# Patient Record
Sex: FEMALE
[De-identification: names, ages, dates, MRNs, and addresses within clinical notes are randomized per-mention and may not be internally consistent; named-entity substitution may affect disease eponyms.]

---

## 2019-10-31 PROBLEM — Z00.00 ENCOUNTER FOR PREVENTIVE HEALTH EXAMINATION: Status: ACTIVE | Noted: 2019-10-31

## 2019-12-03 ENCOUNTER — APPOINTMENT (OUTPATIENT)
Dept: NEUROLOGY | Facility: CLINIC | Age: 29
End: 2019-12-03
Payer: MEDICAID

## 2019-12-03 VITALS
DIASTOLIC BLOOD PRESSURE: 93 MMHG | HEIGHT: 57 IN | HEART RATE: 97 BPM | BODY MASS INDEX: 32.36 KG/M2 | SYSTOLIC BLOOD PRESSURE: 152 MMHG | WEIGHT: 150 LBS

## 2019-12-03 DIAGNOSIS — G40.219 LOCALIZATION-RELATED (FOCAL) (PARTIAL) SYMPTOMATIC EPILEPSY AND EPILEPTIC SYNDROMES WITH COMPLEX PARTIAL SEIZURES, INTRACTABLE, W/OUT STATUS EPILEPTICUS: ICD-10-CM

## 2019-12-03 PROCEDURE — 95816 EEG AWAKE AND DROWSY: CPT

## 2019-12-03 PROCEDURE — 99205 OFFICE O/P NEW HI 60 MIN: CPT

## 2019-12-04 PROCEDURE — 95953: CPT

## 2019-12-05 PROCEDURE — 95953: CPT

## 2019-12-05 NOTE — ASSESSMENT
[FreeTextEntry1] : 30 yo RH woman with a static encephalopathy and poorly controlled epilepsy.  \par \par Based on her reported seizures semiology, it is difficult to say with certainty whether her seizures are of focal or generalized onset.  A genetic etiology is possible, considering family history.\par \par The idea of an EMU evaluation for seizure characterization was discussed with the parents, and at this time, they both feel a prolonged hospitalization with continuous EEG monitoring would be stressful and difficult for patient, but they will consider this if required in the future.  A brain MRI would also have to be done under anesthesia if attempted.\par \par For now, we can make medication adjustments in an attempt to improve seizure control.  Over time, we can aim for a treatment regimen combining Keppra, Vimpat, and Onfi (eventually), and see if this combination is more effective, while monitoring for adverse effects.\par \par Plan:\par 1. Optimize Keppra and taper off Tegretol as follows:\par Instructions for Keppra (Levetiracetam 500mg tablets):\par Week 1: 1000mg in morning and 1000mg at night\par Week 2: 1000mg in morning and 1500mg at night\par Week 3: 1500mg in morning and 1500mg at night, and continue this dose.\par \par Instructions for Tegretol (100mg tablets):\par Week 1: 100mg in morning and 150mg at night\par Week 2: 100mg in morning and 100mg at night\par Week 3: 100mg in morning\par Week 4: STOP\par \par 2. Continue Vimpat 200mg PO BID\par 3. Continue Phenobarbital 30mg PO BID for now.  We may try to slowing taper this off over time as well, and substitute with Onfi.\par 4. They will notify me of any seizure exacerbation or adverse effects.\par 5. Seizure precautions, including no driving or operating heavy equipment, no unsupervised swimming, using a shower instead of a bathtub, no climbing ladders or working at elevations, no use of sharp dangerous tools or devices.\par 6. Patient and parents agree with plan.\par 7. Follow up in 1 month.\par \par Talib Palmer MD\par [facility]\par \par Greater than 50% of the encounter was spent on counseling and coordination of care discussing differential diagnosis, diagnostic testing, and  treatment options. We have talked about appropriate follow up, and I have spent 45 minutes of face to face time with the patient.\par

## 2019-12-05 NOTE — PHYSICAL EXAM
[FreeTextEntry1] : Awake, alert, makes eye contact, able to shake hands with examiner.  Limited language expression, follows simple commands given by parents. Affect normal.  Cranial nerves grossly intact.  Motor exam: normal bulk, normal tone, moves all 4 extremities symmetrically.  No tremors or fasciculations.  Sensory exam: intact to LT.  DTRs: did not cooperate.  Coordination: unable to assess.  Gait: can stand with assistance, ambulates at home without assistance, but in wheelchair today.\par

## 2019-12-05 NOTE — HISTORY OF PRESENT ILLNESS
[FreeTextEntry1] : 28 yo RH woman, accompanied by her parents.  She has a baseline cognitive impairment, with minimal verbal output (in [place]), and is dependent on her parents for care.\par \par 16 days after being born she developed pneumonia while living in [facility].  She recovered but had motor delay, starting to walk at 16 months.  She also had speech delay.   At age 6, she developed diabetes requiring insulin.  She was hospitalized for almost a month trying to obtain glucose control.  At age 8, she developed a few words, trying to communicate with mother.  She can now communicate basic needs in [place] to her parents.  She continued having challenges with glucose control.  She had hearing checked which was normal.  At age 19, her father brought her to [facility].  She was followed by primary care while she was here.  \par \par In 2015, at the age of 26, she had her first seizure.  It was a generalized convulsive seizure.  She was taken to [facility].  While in the ER, she had 4 more convulsive seizures.  She followed up with Dr Mariee, neurologist, who has been managing her antiseizure medications.  She has continued to have seizures in spite of the medications.\par \par Seizure Description:  Her eyes roll upwards, she has a behavioral arrest, then her entire body becomes rigid, she stops breathing, and her body remains rigid for 30-40 seconds.  When she is recovering she becomes combative.\par \par Seizure Frequency: 2-3 seizures per week\par \par Family History: paternal cousin with similar syndrome of cognitive impairment and epilepsy\par \par Social Hx: lives with parents, home health aid 4 hours per day 3 times per week.  She has one older sister, healthy. \par \par PCP: Dr Laila Lawler ([facility])\par Neurologist: Dr Jana Mariee (assistant Eva [phone])\par \par Current AEDs:\par Keppra 500mg PO BID (for 1.5 years, no side effects, never been on higher dose)\par Vimpat 200mg PO BID (x 3 years)\par Tegretol 400mg PO BID (x 2 years)\par Phenobarbital 30mg PO BID (x 1-2 years)\par \par Previous AEDs:\par Dilantin (gingival hyperplasia)\par Depakote (hair loss)\par Aptiom (intolerable)\par \par She also takes Folic 400mcg daily\par \par Head CT (October 2016): bilateral basal ganglia calcifications\par \par Brain MRI has never been done.\par \par Routine EEG done today, no epileptiform discharges were seen, background slowing (poor quality).

## 2019-12-10 ENCOUNTER — OTHER (OUTPATIENT)
Age: 29
End: 2019-12-10

## 2020-01-03 ENCOUNTER — APPOINTMENT (OUTPATIENT)
Dept: NEUROLOGY | Facility: CLINIC | Age: 30
End: 2020-01-03
Payer: MEDICAID

## 2020-01-03 VITALS
DIASTOLIC BLOOD PRESSURE: 72 MMHG | BODY MASS INDEX: 31.93 KG/M2 | WEIGHT: 148 LBS | SYSTOLIC BLOOD PRESSURE: 137 MMHG | HEART RATE: 92 BPM | HEIGHT: 57 IN

## 2020-01-03 PROCEDURE — 99214 OFFICE O/P EST MOD 30 MIN: CPT

## 2020-01-03 RX ORDER — MIDAZOLAM 5 MG/.1ML
5 SPRAY NASAL
Qty: 1 | Refills: 1 | Status: ACTIVE | COMMUNITY
Start: 2020-01-03 | End: 1900-01-01

## 2020-01-06 NOTE — PHYSICAL EXAM
[FreeTextEntry1] : Awake, alert, makes eye contact, able to shake hands with examiner. Limited language expression, follows simple commands given by parents. Affect normal. Cranial nerves grossly intact. Motor exam: normal bulk, normal tone, moves all 4 extremities symmetrically. No tremors or fasciculations. Sensory exam: intact to LT. DTRs: did not cooperate. Coordination: unable to assess. Gait: can stand with assistance, ambulates at home without assistance, but in wheelchair today.

## 2020-01-06 NOTE — HISTORY OF PRESENT ILLNESS
[FreeTextEntry1] : 28 yo RH woman with a static encephalopathy and poorly controlled epilepsy. \par Based on her reported seizures semiology, it is difficult to say with certainty whether her seizures are of focal or generalized onset. A genetic etiology is possible, considering family history.\par \par Since last visit, she tapered off Carbamazepine, and titrated Keppra up to 1500mg BID.  No adverse side effects.\par \par Last Saturday she had one prolonged seizure, 3-4 minutes, then had a seizure on Monday, and one on Tuesday, but has not had a seizure in the past 2 days.  \par \par Father also reports that in the past when Phenobarbital was discontinued, she had frequent and severe breakthrough seizures, but he is not sure how slowly it was tapered.\par \par Parents also discussed idea of EMU admission, and both agree it would be very difficult for patient, and she would likely not cooperate.\par \par Current AEDs:\par Keppra 1500mg PO BID\par Vimpat 200mg PO BID\par Phenobarbital 30mg PO BID\par \par Previous AEDs:\par Dilantin (gingival hyperplasia)\par Depakote (hair loss)\par Aptiom (intolerable)\par Carbamazepine

## 2020-01-06 NOTE — ASSESSMENT
[FreeTextEntry1] : 30 yo RH woman with a static encephalopathy and poorly controlled epilepsy. \par Based on her reported seizures semiology, it is difficult to say with certainty whether her seizures are of focal or generalized onset. A genetic etiology is possible, considering family history.\par \par Plan is to make AED adjustment with aim of improved seizure control.\par \par Plan:\par 1. Continue Keppra (Levetiracetam) 1500mg twice a day\par 2. Continue Vimpat (Lacosamide) 200mg twice a day\par 3. Continue Phenobarbital 30mg twice a day\par 4. Start Onfi (Clobazam) 5mg twice a day\par 5. They will notify me of any seizure exacerbation or adverse effects.\par 6. Seizure precautions, including no driving or operating heavy equipment, no unsupervised swimming, using a shower instead of a bathtub, no climbing ladders or working at elevations, no use of sharp dangerous tools or devices.\par 7. Patient and parents agree with plan.\par 8. Follow up in 3 months (father reports that sooner follow up is difficult for family).\par \par Talib Palmer MD\par [facility]\par \par Greater than 50% of the encounter was spent on counseling and coordination of care discussin treatment options. We have talked about appropriate follow up, and I have spent 25 minutes of face to face time with the patient.\par

## 2020-01-10 ENCOUNTER — RX RENEWAL (OUTPATIENT)
Age: 30
End: 2020-01-10

## 2020-01-10 RX ORDER — CLONAZEPAM 1 MG/1
1 TABLET, ORALLY DISINTEGRATING ORAL
Qty: 10 | Refills: 0 | Status: ACTIVE | COMMUNITY
Start: 2020-01-10 | End: 1900-01-01

## 2020-04-27 ENCOUNTER — APPOINTMENT (OUTPATIENT)
Dept: NEUROLOGY | Facility: CLINIC | Age: 30
End: 2020-04-27
Payer: MEDICAID

## 2020-04-27 PROCEDURE — 99446 NTRPROF PH1/NTRNET/EHR 5-10: CPT

## 2020-04-27 RX ORDER — LACOSAMIDE 200 MG/1
200 TABLET, FILM COATED ORAL
Qty: 60 | Refills: 5 | Status: ACTIVE | COMMUNITY
Start: 2020-01-03 | End: 1900-01-01

## 2020-04-27 RX ORDER — PHENOBARBITAL 30 MG/1
30 TABLET ORAL
Qty: 60 | Refills: 5 | Status: ACTIVE | COMMUNITY
Start: 2020-01-03 | End: 1900-01-01

## 2020-04-27 RX ORDER — LEVETIRACETAM 500 MG/1
500 TABLET, FILM COATED ORAL
Qty: 360 | Refills: 3 | Status: ACTIVE | COMMUNITY
Start: 2019-12-03 | End: 1900-01-01

## 2020-07-15 RX ORDER — CLOBAZAM 10 MG/1
10 TABLET ORAL
Qty: 30 | Refills: 5 | Status: DISCONTINUED | COMMUNITY
Start: 2020-01-03 | End: 2020-07-15

## 2021-05-24 ENCOUNTER — RX RENEWAL (OUTPATIENT)
Age: 31
End: 2021-05-24

## 2021-06-28 ENCOUNTER — APPOINTMENT (OUTPATIENT)
Dept: NEUROLOGY | Facility: CLINIC | Age: 31
End: 2021-06-28
Payer: MEDICAID

## 2021-06-28 ENCOUNTER — TRANSCRIPTION ENCOUNTER (OUTPATIENT)
Age: 31
End: 2021-06-28

## 2021-06-28 PROCEDURE — 99214 OFFICE O/P EST MOD 30 MIN: CPT | Mod: 95

## 2021-07-04 NOTE — ASSESSMENT
[FreeTextEntry1] : 32 yo RH woman with a static encephalopathy and poorly controlled epilepsy. \par Based on her reported seizures semiology, it is difficult to say with certainty whether her seizures are of focal or generalized onset. A genetic etiology is possible, considering family history.\par \par We discussed polypharmacy and how ideally we would like to simplify her AED regimen over time, however, previous attempts have lead to seizure exacerbations.  Parents feel EMU admission would be difficult for patient and family.  Significant improvement with addition of Onfi.\par \par Plan:\par 1. Increase Onfi to 15mg PO BID\par 2. Continue all other AEDs at current doses.\par 3. They will notify me of any seizure exacerbation or adverse effects.\par 4. Seizure precautions, including no driving or operating heavy equipment, no unsupervised swimming, using a shower instead of a bathtub, no climbing ladders or working at elevations, no use of sharp dangerous tools or devices.\par 5. Patient and parents agree with plan.\par 6. Follow up in 3 months (father reports that sooner follow up is difficult for family).\par \par Talib Palmer MD\par [facility]\par \par Greater than 50% of the encounter was spent on counseling and coordination of care discussing treatment options. We have talked about appropriate follow up, and I have spent 15 minutes of face to face time with the patient.

## 2021-07-04 NOTE — HISTORY OF PRESENT ILLNESS
[Home] : at home, [unfilled] , at the time of the visit. [Medical Office: ([facility])___] : at the medical office located in  [Verbal consent obtained from patient] : the patient, [unfilled] [FreeTextEntry1] : Verbal consent given by patient and father on 06/28/21 at 2:40pm.\par \par HPI:\par 30 yo RH woman with a static encephalopathy and poorly controlled epilepsy. \par Based on her reported seizures semiology, it is difficult to say with certainty whether her seizures are of focal or generalized onset. A genetic etiology is possible, considering family history.\par \par Since last visit, father reports significant seizure reduction with addition of Onfi 10mg BID, however, she continues to have at least 1-2 breakthrough GTC seizures per month.\par \par Parents discussed idea of EMU admission, and both agree it would be very difficult for patient, and she would likely not cooperate.\par \par Current AEDs:\par Onfi 10mg PO BID\par Keppra 1000mg PO BID\par Vimpat 200mg PO BID\par Phenobarbital 30mg PO BID\par Carbamazepine 200mg PO BID\par \par Previous AEDs:\par Dilantin (gingival hyperplasia)\par Depakote (hair loss)\par Aptiom (intolerable)

## 2021-07-06 ENCOUNTER — APPOINTMENT (OUTPATIENT)
Dept: NEUROLOGY | Facility: CLINIC | Age: 31
End: 2021-07-06

## 2021-07-26 ENCOUNTER — TRANSCRIPTION ENCOUNTER (OUTPATIENT)
Age: 31
End: 2021-07-26

## 2021-08-31 ENCOUNTER — TRANSCRIPTION ENCOUNTER (OUTPATIENT)
Age: 31
End: 2021-08-31

## 2021-09-20 ENCOUNTER — TRANSCRIPTION ENCOUNTER (OUTPATIENT)
Age: 31
End: 2021-09-20

## 2021-10-21 ENCOUNTER — TRANSCRIPTION ENCOUNTER (OUTPATIENT)
Age: 31
End: 2021-10-21

## 2021-11-22 ENCOUNTER — TRANSCRIPTION ENCOUNTER (OUTPATIENT)
Age: 31
End: 2021-11-22

## 2021-12-03 ENCOUNTER — NON-APPOINTMENT (OUTPATIENT)
Age: 31
End: 2021-12-03

## 2021-12-22 ENCOUNTER — TRANSCRIPTION ENCOUNTER (OUTPATIENT)
Age: 31
End: 2021-12-22

## 2022-01-25 ENCOUNTER — TRANSCRIPTION ENCOUNTER (OUTPATIENT)
Age: 32
End: 2022-01-25

## 2022-02-23 ENCOUNTER — APPOINTMENT (OUTPATIENT)
Dept: NEUROLOGY | Facility: CLINIC | Age: 32
End: 2022-02-23
Payer: MEDICAID

## 2022-02-23 PROCEDURE — 99214 OFFICE O/P EST MOD 30 MIN: CPT | Mod: 95

## 2022-02-23 RX ORDER — CLOBAZAM 10 MG/1
10 TABLET ORAL
Qty: 90 | Refills: 0 | Status: DISCONTINUED | COMMUNITY
Start: 2020-07-15 | End: 2022-02-23

## 2022-02-23 NOTE — ASSESSMENT
[FreeTextEntry1] : 30 yo RH woman with a static encephalopathy and poorly controlled epilepsy. \par Based on her reported seizures semiology, it is difficult to say with certainty whether her seizures are of focal or generalized onset. A genetic etiology is possible, considering family history.\par \par Recent breakthrough seizures over the past month.\par \par Plan:\par 1. Increase Onfi to 20mg PO BID\par 2. Continue all other AEDs at current doses.\par 3. They will notify me of any seizure exacerbation or adverse effects.\par 4. MRI brain, temporal lobe epilepsy protocol, under sedation\par 5. Seizure precautions, including no driving or operating heavy equipment, no unsupervised swimming, using a shower instead of a bathtub, no climbing ladders or working at elevations, no use of sharp dangerous tools or devices.\par 6. Patient and parents agree with plan.\par 7. Follow up in 3 months (father reports that sooner follow up is difficult for family).\par \par Talib Palmer MD\par [facility]\par \par Greater than 50% of the encounter was spent on counseling and coordination of care discussing treatment options. We have talked about appropriate follow up, and I have spent 15 minutes of face to face time with the patient.

## 2022-02-23 NOTE — HISTORY OF PRESENT ILLNESS
[Home] : at home, [unfilled] , at the time of the visit. [Medical Office: ([facility])___] : at the medical office located in  [Verbal consent obtained from patient] : the patient, [unfilled] [FreeTextEntry1] : 30 yo RH woman with a static encephalopathy and poorly controlled epilepsy. \par Based on her reported seizures semiology, it is difficult to say with certainty whether her seizures are of focal or generalized onset. A genetic etiology is possible, considering family history.\par \par Father reports that overall patient has had significant reduction in seizure frequency since starting Onfi, currently taking 15mg BID, however, over the past 1 month, had 6 breakthrough seizures.  Her PCP recommended increasing her Phenobarbital however, wanted to discuss with us first.  \par \par We had considered EMU admission in the past, but this would be very impractical for family and patient, she would not likely tolerate prolonged monitoring.\par \par Current AEDs:\par Onfi 15mg PO BID\par Keppra 1000mg PO BID\par Vimpat 200mg PO BID\par Phenobarbital 30mg PO BID\par Carbamazepine 200mg PO BID\par \par Previous AEDs:\par Dilantin (gingival hyperplasia)\par Depakote (hair loss)\par Aptiom (intolerable)

## 2022-02-25 DIAGNOSIS — Z20.822 CONTACT WITH AND (SUSPECTED) EXPOSURE TO COVID-19: ICD-10-CM

## 2022-04-29 ENCOUNTER — TRANSCRIPTION ENCOUNTER (OUTPATIENT)
Age: 32
End: 2022-04-29

## 2022-05-11 ENCOUNTER — APPOINTMENT (OUTPATIENT)
Dept: MRI IMAGING | Facility: CLINIC | Age: 32
End: 2022-05-11
Payer: MEDICAID

## 2022-05-11 ENCOUNTER — OUTPATIENT (OUTPATIENT)
Dept: OUTPATIENT SERVICES | Facility: HOSPITAL | Age: 32
LOS: 1 days | End: 2022-05-11
Payer: MEDICAID

## 2022-05-11 DIAGNOSIS — G40.219 LOCALIZATION-RELATED (FOCAL) (PARTIAL) SYMPTOMATIC EPILEPSY AND EPILEPTIC SYNDROMES WITH COMPLEX PARTIAL SEIZURES, INTRACTABLE, WITHOUT STATUS EPILEPTICUS: ICD-10-CM

## 2022-05-11 PROCEDURE — 70551 MRI BRAIN STEM W/O DYE: CPT

## 2022-05-11 PROCEDURE — 76377 3D RENDER W/INTRP POSTPROCES: CPT | Mod: 26

## 2022-05-11 PROCEDURE — 70551 MRI BRAIN STEM W/O DYE: CPT | Mod: 26

## 2022-05-11 PROCEDURE — 76377 3D RENDER W/INTRP POSTPROCES: CPT

## 2022-05-17 ENCOUNTER — NON-APPOINTMENT (OUTPATIENT)
Age: 32
End: 2022-05-17

## 2022-05-24 ENCOUNTER — NON-APPOINTMENT (OUTPATIENT)
Age: 32
End: 2022-05-24

## 2022-06-23 ENCOUNTER — TRANSCRIPTION ENCOUNTER (OUTPATIENT)
Age: 32
End: 2022-06-23

## 2022-07-22 ENCOUNTER — TRANSCRIPTION ENCOUNTER (OUTPATIENT)
Age: 32
End: 2022-07-22

## 2022-07-27 ENCOUNTER — TRANSCRIPTION ENCOUNTER (OUTPATIENT)
Age: 32
End: 2022-07-27

## 2022-08-15 ENCOUNTER — NON-APPOINTMENT (OUTPATIENT)
Age: 32
End: 2022-08-15

## 2023-03-13 ENCOUNTER — TRANSCRIPTION ENCOUNTER (OUTPATIENT)
Age: 33
End: 2023-03-13

## 2023-04-11 ENCOUNTER — TRANSCRIPTION ENCOUNTER (OUTPATIENT)
Age: 33
End: 2023-04-11

## 2023-05-15 ENCOUNTER — TRANSCRIPTION ENCOUNTER (OUTPATIENT)
Age: 33
End: 2023-05-15

## 2023-06-14 ENCOUNTER — TRANSCRIPTION ENCOUNTER (OUTPATIENT)
Age: 33
End: 2023-06-14

## 2023-07-10 ENCOUNTER — TRANSCRIPTION ENCOUNTER (OUTPATIENT)
Age: 33
End: 2023-07-10

## 2023-10-06 ENCOUNTER — TRANSCRIPTION ENCOUNTER (OUTPATIENT)
Age: 33
End: 2023-10-06

## 2023-12-12 ENCOUNTER — TRANSCRIPTION ENCOUNTER (OUTPATIENT)
Age: 33
End: 2023-12-12

## 2024-03-28 ENCOUNTER — TRANSCRIPTION ENCOUNTER (OUTPATIENT)
Age: 34
End: 2024-03-28

## 2024-03-28 RX ORDER — CLOBAZAM 20 MG/1
20 TABLET ORAL
Qty: 180 | Refills: 0 | Status: ACTIVE | COMMUNITY
Start: 2022-02-23 | End: 1900-01-01

## 2024-07-16 ENCOUNTER — TRANSCRIPTION ENCOUNTER (OUTPATIENT)
Age: 34
End: 2024-07-16

## 2024-07-17 ENCOUNTER — TRANSCRIPTION ENCOUNTER (OUTPATIENT)
Age: 34
End: 2024-07-17

## 2025-06-03 ENCOUNTER — NON-APPOINTMENT (OUTPATIENT)
Age: 35
End: 2025-06-03

## 2025-06-04 ENCOUNTER — APPOINTMENT (OUTPATIENT)
Dept: NEUROLOGY | Facility: CLINIC | Age: 35
End: 2025-06-04

## 2025-06-25 ENCOUNTER — APPOINTMENT (OUTPATIENT)
Dept: NEUROLOGY | Facility: CLINIC | Age: 35
End: 2025-06-25

## 2025-06-26 ENCOUNTER — TRANSCRIPTION ENCOUNTER (OUTPATIENT)
Age: 35
End: 2025-06-26